# Patient Record
Sex: FEMALE
[De-identification: names, ages, dates, MRNs, and addresses within clinical notes are randomized per-mention and may not be internally consistent; named-entity substitution may affect disease eponyms.]

---

## 2024-06-25 ENCOUNTER — DASHBOARD ENCOUNTERS (OUTPATIENT)
Age: 39
End: 2024-06-25

## 2024-07-02 ENCOUNTER — OFFICE VISIT (OUTPATIENT)
Dept: URBAN - METROPOLITAN AREA CLINIC 60 | Facility: CLINIC | Age: 39
End: 2024-07-02
Payer: COMMERCIAL

## 2024-07-02 VITALS
DIASTOLIC BLOOD PRESSURE: 76 MMHG | OXYGEN SATURATION: 98 % | RESPIRATION RATE: 20 BRPM | BODY MASS INDEX: 31.5 KG/M2 | HEIGHT: 62 IN | WEIGHT: 171.2 LBS | TEMPERATURE: 98.1 F | SYSTOLIC BLOOD PRESSURE: 116 MMHG | HEART RATE: 80 BPM

## 2024-07-02 DIAGNOSIS — K29.60 REFLUX GASTRITIS: ICD-10-CM

## 2024-07-02 PROCEDURE — 99214 OFFICE O/P EST MOD 30 MIN: CPT | Performed by: NURSE PRACTITIONER

## 2024-07-02 RX ORDER — OMEPRAZOLE 20 MG/1
1 CAPSULE 30 MINUTES BEFORE MORNING MEAL CAPSULE, DELAYED RELEASE ORAL ONCE A DAY
Qty: 30 | Refills: 2 | OUTPATIENT
Start: 2024-07-02

## 2024-07-02 RX ORDER — SUCRALFATE 1 G/1
1 TABLET ON AN EMPTY STOMACH TABLET ORAL TWICE A DAY
Qty: 180 TABLET | Refills: 0 | OUTPATIENT
Start: 2024-07-02 | End: 2024-09-30

## 2024-07-02 NOTE — HPI-TODAY'S VISIT:
7/24 Patient here today complaining having symptom of gastritis and reflux.  Her symptoms are chronic but now seems to be worse.  Patient will do diet and treatment with omeprazole 20 mg once a day and Carafate 1 g twice a day. EGD.

## 2024-07-09 ENCOUNTER — LAB OUTSIDE AN ENCOUNTER (OUTPATIENT)
Dept: URBAN - METROPOLITAN AREA CLINIC 60 | Facility: CLINIC | Age: 39
End: 2024-07-09

## 2024-08-03 PROBLEM — 57433008: Status: ACTIVE | Noted: 2024-08-03

## 2024-08-30 ENCOUNTER — CLAIMS CREATED FROM THE CLAIM WINDOW (OUTPATIENT)
Dept: URBAN - METROPOLITAN AREA SURGERY CENTER 4 | Facility: SURGERY CENTER | Age: 39
End: 2024-08-30
Payer: COMMERCIAL

## 2024-08-30 DIAGNOSIS — K29.60 OTHER GASTRITIS WITHOUT BLEEDING: ICD-10-CM

## 2024-08-30 DIAGNOSIS — K22.89 OTHER SPECIFIED DISEASE OF ESOPHAGUS: ICD-10-CM

## 2024-08-30 DIAGNOSIS — K44.9 DIAPHRAGMATIC HERNIA WITHOUT OBSTRUCTION OR GANGRENE: ICD-10-CM

## 2024-08-30 PROCEDURE — 43239 EGD BIOPSY SINGLE/MULTIPLE: CPT | Performed by: INTERNAL MEDICINE

## 2024-08-30 RX ORDER — OMEPRAZOLE 20 MG/1
1 CAPSULE 30 MINUTES BEFORE MORNING MEAL CAPSULE, DELAYED RELEASE ORAL ONCE A DAY
Qty: 30 | Refills: 2 | Status: ACTIVE | COMMUNITY
Start: 2024-07-02

## 2024-08-30 RX ORDER — SUCRALFATE 1 G/1
1 TABLET ON AN EMPTY STOMACH TABLET ORAL TWICE A DAY
Qty: 180 TABLET | Refills: 0 | Status: ACTIVE | COMMUNITY
Start: 2024-07-02 | End: 2024-09-30

## 2024-09-12 ENCOUNTER — OFFICE VISIT (OUTPATIENT)
Dept: URBAN - METROPOLITAN AREA CLINIC 60 | Facility: CLINIC | Age: 39
End: 2024-09-12

## 2024-09-27 ENCOUNTER — OFFICE VISIT (OUTPATIENT)
Dept: URBAN - METROPOLITAN AREA CLINIC 60 | Facility: CLINIC | Age: 39
End: 2024-09-27

## 2024-10-02 ENCOUNTER — ERX REFILL RESPONSE (OUTPATIENT)
Dept: URBAN - METROPOLITAN AREA CLINIC 60 | Facility: CLINIC | Age: 39
End: 2024-10-02

## 2024-10-02 RX ORDER — OMEPRAZOLE 20 MG/1
TAKE 1 CAPSULE BY MOUTH DAILY 30 MINUTES BEFORE BREAKFAST CAPSULE, DELAYED RELEASE ORAL
Qty: 30 CAPSULE | Refills: 0 | OUTPATIENT

## 2024-10-02 RX ORDER — OMEPRAZOLE 20 MG/1
TAKE 1 CAPSULE BY MOUTH DAILY 30 MINUTES BEFORE BREAKFAST CAPSULE, DELAYED RELEASE ORAL
Qty: 90 CAPSULE | Refills: 0 | OUTPATIENT

## 2024-10-02 RX ORDER — OMEPRAZOLE 20 MG/1
1 CAPSULE 30 MINUTES BEFORE MORNING MEAL CAPSULE, DELAYED RELEASE ORAL ONCE A DAY
Qty: 30 | Refills: 2 | OUTPATIENT

## 2024-11-06 ENCOUNTER — OFFICE VISIT (OUTPATIENT)
Dept: URBAN - METROPOLITAN AREA CLINIC 60 | Facility: CLINIC | Age: 39
End: 2024-11-06

## 2024-11-07 ENCOUNTER — OFFICE VISIT (OUTPATIENT)
Dept: URBAN - METROPOLITAN AREA CLINIC 60 | Facility: CLINIC | Age: 39
End: 2024-11-07

## 2024-11-07 RX ORDER — OMEPRAZOLE 20 MG/1
TAKE 1 CAPSULE BY MOUTH DAILY 30 MINUTES BEFORE BREAKFAST CAPSULE, DELAYED RELEASE ORAL
Qty: 90 CAPSULE | Refills: 0 | Status: ACTIVE | COMMUNITY

## 2025-01-17 ENCOUNTER — LAB OUTSIDE AN ENCOUNTER (OUTPATIENT)
Dept: URBAN - METROPOLITAN AREA CLINIC 63 | Facility: CLINIC | Age: 40
End: 2025-01-17

## 2025-01-17 ENCOUNTER — OFFICE VISIT (OUTPATIENT)
Dept: URBAN - METROPOLITAN AREA CLINIC 60 | Facility: CLINIC | Age: 40
End: 2025-01-17

## 2025-01-17 ENCOUNTER — OFFICE VISIT (OUTPATIENT)
Dept: URBAN - METROPOLITAN AREA CLINIC 63 | Facility: CLINIC | Age: 40
End: 2025-01-17
Payer: COMMERCIAL

## 2025-01-17 VITALS
DIASTOLIC BLOOD PRESSURE: 64 MMHG | BODY MASS INDEX: 32.57 KG/M2 | OXYGEN SATURATION: 99 % | WEIGHT: 177 LBS | TEMPERATURE: 97.4 F | HEIGHT: 62 IN | HEART RATE: 78 BPM | SYSTOLIC BLOOD PRESSURE: 110 MMHG

## 2025-01-17 DIAGNOSIS — R10.13 EPIGASTRIC PAIN: ICD-10-CM

## 2025-01-17 DIAGNOSIS — R19.7 ACUTE DIARRHEA: ICD-10-CM

## 2025-01-17 PROCEDURE — 99214 OFFICE O/P EST MOD 30 MIN: CPT

## 2025-01-17 RX ORDER — OMEPRAZOLE 20 MG/1
TAKE 1 CAPSULE BY MOUTH DAILY 30 MINUTES BEFORE BREAKFAST CAPSULE, DELAYED RELEASE ORAL
Qty: 90 CAPSULE | Refills: 0 | Status: ACTIVE | COMMUNITY

## 2025-01-17 NOTE — HPI-TODAY'S VISIT:
1/25 This is a 39 years old female patient with past medical history of gastritis and.  Seen in the office visit today complaining about epigastric pain (Left upper quadrant) associated with food ingestion, patient reports the pain usually comes after she eats, patient also complaining about diarrhea episodes.  No dysphagia, heartburn, regurgitation, unintentional weight loss, nausea, vomiting, hematemesis or melena. no blood per rectum. No complaints of bloating. No jaundice, icterus. There have been no new complaints of cough, fever, joint pain or muscle aches.     8/30/2024 EGD Findings: The examined esophagus was normal.  Biopsies were taken with cold forceps for histology. Small hiatal hernia was present. Diffuse mild inflammation characterized by edema and congestion edema was found in the gastric body and the gastric antrum.  Biopsies were taken with cold forceps for H. pylori testing. The examined duodenum was normal.  Biopsies were taken with cold forceps for histology. 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

## 2025-01-27 LAB
A/G RATIO: 1.5
ALBUMIN: 4.4
ALKALINE PHOSPHATASE: 65
ALT (SGPT): 33
AMYLASE: 51
AST (SGOT): 24
BILIRUBIN, TOTAL: 0.4
BUN/CREATININE RATIO: (no result)
BUN: 13
CALCIUM: 9.3
CARBON DIOXIDE, TOTAL: 28
CHLORIDE: 102
CHOL/HDLC RATIO: 4.3
CHOLESTEROL, TOTAL: 190
CREATININE: 0.74
EGFR: 105
GLOBULIN, TOTAL: 3
GLUCOSE: 83
HDL CHOLESTEROL: 44
HEMATOCRIT: 39.5
HEMOGLOBIN: 12.8
IGG, SUBCLASS 1: 427
IGG, SUBCLASS 2: 605
IGG, SUBCLASS 3: 64
IGG, SUBCLASS 4: 41.2
IMMUNOGLOBULIN G, QN, SERUM: 1153
INR: 1
LDL CHOLESTEROL CALC: 112
LIPASE: 28
MCH: 28
MCHC: 32.4
MCV: 86.4
MPV: 10
NON HDL CHOLESTEROL: 146
PLATELET COUNT: 264
POTASSIUM: 4.3
PROTEIN, TOTAL: 7.4
PT: 10.3
RDW: 12.5
RED BLOOD CELL COUNT: 4.57
SODIUM: 138
TRIGLYCERIDES: 227
WHITE BLOOD CELL COUNT: 8.2

## 2025-02-26 ENCOUNTER — OFFICE VISIT (OUTPATIENT)
Dept: URBAN - METROPOLITAN AREA CLINIC 63 | Facility: CLINIC | Age: 40
End: 2025-02-26

## 2025-03-03 ENCOUNTER — LAB OUTSIDE AN ENCOUNTER (OUTPATIENT)
Dept: URBAN - METROPOLITAN AREA CLINIC 60 | Facility: CLINIC | Age: 40
End: 2025-03-03

## 2025-03-03 ENCOUNTER — OFFICE VISIT (OUTPATIENT)
Dept: URBAN - METROPOLITAN AREA CLINIC 60 | Facility: CLINIC | Age: 40
End: 2025-03-03
Payer: COMMERCIAL

## 2025-03-03 VITALS
WEIGHT: 172.2 LBS | DIASTOLIC BLOOD PRESSURE: 60 MMHG | SYSTOLIC BLOOD PRESSURE: 110 MMHG | BODY MASS INDEX: 31.69 KG/M2 | HEART RATE: 75 BPM | HEIGHT: 62 IN | OXYGEN SATURATION: 98 % | TEMPERATURE: 97.7 F

## 2025-03-03 DIAGNOSIS — Z86.19 HISTORY OF HELICOBACTER PYLORI INFECTION: ICD-10-CM

## 2025-03-03 DIAGNOSIS — K76.0 FATTY LIVER: ICD-10-CM

## 2025-03-03 DIAGNOSIS — K29.60 REFLUX GASTRITIS: ICD-10-CM

## 2025-03-03 PROBLEM — 197321007: Status: ACTIVE | Noted: 2025-03-03

## 2025-03-03 PROCEDURE — 99214 OFFICE O/P EST MOD 30 MIN: CPT

## 2025-03-03 RX ORDER — OMEPRAZOLE 20 MG/1
TAKE 1 CAPSULE BY MOUTH DAILY 30 MINUTES BEFORE BREAKFAST CAPSULE, DELAYED RELEASE ORAL
Qty: 90 CAPSULE | Refills: 0 | Status: ACTIVE | COMMUNITY

## 2025-03-03 NOTE — HPI-TODAY'S VISIT:
1/25 This is a 39 years old female patient with past medical history of gastritis and.  Seen in the office visit today complaining about epigastric pain (Left upper quadrant) associated with food ingestion, patient reports the pain usually comes after she eats, patient also complaining about diarrhea episodes.  No dysphagia, heartburn, regurgitation, unintentional weight loss, nausea, vomiting, hematemesis or melena. No blood per rectum. No complaints of bloating. No jaundice, icterus. There have been no new complaints of cough, fever, joint pain or muscle aches.   8/30/2024 EGD Findings: The examined esophagus was normal.  Biopsies were taken with cold forceps for histology. Small hiatal hernia was present. Diffuse mild inflammation characterized by edema and congestion edema was found in the gastric body and the gastric antrum.  Biopsies were taken with cold forceps for H. pylori testing. The examined duodenum was normal.  Biopsies were taken with cold forceps for histology. 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  3/35 Patient seen today in office visit in good general state she is here for follow-up medical treatment and CT. Patient reporting she does not have more epigastric pain with diarrhea, patient is eating very healthy, she is tried to avoid the fatty food, patient is following the GERD diet, drinking plenty of water reports, reported acid reflux and heartburn symptoms are staying the same, patient reports that she remembers when she was diagnosed with H. pylori she development the same symptoms, will do H. pylori test, patient CT discussed fatty liver was found, will do FibroScan.  Will follow up in 6 weeks.

## 2025-03-27 ENCOUNTER — TELEPHONE ENCOUNTER (OUTPATIENT)
Dept: URBAN - METROPOLITAN AREA CLINIC 59 | Facility: CLINIC | Age: 40
End: 2025-03-27

## 2025-03-27 LAB — H PYLORI BREATH TEST: DETECTED

## 2025-03-31 ENCOUNTER — OFFICE VISIT (OUTPATIENT)
Dept: URBAN - METROPOLITAN AREA CLINIC 60 | Facility: CLINIC | Age: 40
End: 2025-03-31
Payer: COMMERCIAL

## 2025-03-31 DIAGNOSIS — A04.8 H. PYLORI INFECTION: ICD-10-CM

## 2025-03-31 DIAGNOSIS — K29.60 REFLUX GASTRITIS: ICD-10-CM

## 2025-03-31 PROCEDURE — 99214 OFFICE O/P EST MOD 30 MIN: CPT

## 2025-03-31 RX ORDER — OMEPRAZOLE MAGNESIUM, AMOXICILLIN AND RIFABUTIN 10; 250; 12.5 MG/1; MG/1; MG/1
4 CAPSULES CAPSULE, DELAYED RELEASE ORAL
Qty: 168 | OUTPATIENT
Start: 2025-03-31 | End: 2025-04-14

## 2025-03-31 RX ORDER — OMEPRAZOLE 20 MG/1
TAKE 1 CAPSULE BY MOUTH DAILY 30 MINUTES BEFORE BREAKFAST CAPSULE, DELAYED RELEASE ORAL
Qty: 90 CAPSULE | Refills: 0 | Status: ACTIVE | COMMUNITY

## 2025-03-31 NOTE — HPI-TODAY'S VISIT:
1/25 This is a 39 years old female patient with past medical history of gastritis and.  Seen in the office visit today complaining about epigastric pain (Left upper quadrant) associated with food ingestion, patient reports the pain usually comes after she eats, patient also complaining about diarrhea episodes.  No dysphagia, heartburn, regurgitation, unintentional weight loss, nausea, vomiting, hematemesis or melena. No blood per rectum. No complaints of bloating. No jaundice, icterus. There have been no new complaints of cough, fever, joint pain or muscle aches.   8/30/2024 EGD Findings: The examined esophagus was normal.  Biopsies were taken with cold forceps for histology. Small hiatal hernia was present. Diffuse mild inflammation characterized by edema and congestion edema was found in the gastric body and the gastric antrum.  Biopsies were taken with cold forceps for H. pylori testing. The examined duodenum was normal.  Biopsies were taken with cold forceps for histology. 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  3/35 Patient seen today in office visit in good general state she is here for follow-up medical treatment and CT. Patient reporting she does not have more epigastric pain with diarrhea, patient is eating very healthy, she is tried to avoid the fatty food, patient is following the GERD diet, drinking plenty of water reports, reported acid reflux and heartburn symptoms are staying the same, patient reports that she remembers when she was diagnosed with H. pylori she development the same symptoms, will do H. pylori test, patient CT discussed fatty liver was found, will do FibroScan.  Will follow up in 6 weeks.  3/25 Patient is here in the office visit to follow-up urea breath test, patient reported PCP  ordered treatment for H. pylori she completed treatment after 14 days she repeated urea breath test came back positive result, will start treatment with talicia, patient reported she continues with the same symptoms acid reflux and heartburn does improve, will follow up patient in 6 weeks.

## 2025-04-07 ENCOUNTER — TELEPHONE ENCOUNTER (OUTPATIENT)
Dept: URBAN - METROPOLITAN AREA CLINIC 63 | Facility: CLINIC | Age: 40
End: 2025-04-07

## 2025-04-07 RX ORDER — OMEPRAZOLE 20 MG/1
1 CAPSULE 30 MINUTES BEFORE MORNING MEAL CAPSULE, DELAYED RELEASE ORAL TWICE A DAY
Qty: 28 CAPSULE | Refills: 0 | OUTPATIENT
Start: 2025-04-11

## 2025-04-07 RX ORDER — BISMUTH SUBSALICYLATE 262 MG
2 TABLETS WITH MEALS AND AT BEDTIME TABLET,CHEWABLE ORAL
Qty: 112 TABLET | Refills: 0 | OUTPATIENT
Start: 2025-04-11 | End: 2025-04-25

## 2025-04-07 RX ORDER — TETRACYCLINE HYDROCHLORIDE 500 MG/1
1 CAPSULE ON AN EMPTY STOMACH CAPSULE ORAL
Qty: 56 CAPSULE | Refills: 0 | OUTPATIENT
Start: 2025-04-11 | End: 2025-04-25

## 2025-04-07 RX ORDER — METRONIDAZOLE 500 MG/1
1 TABLET TABLET ORAL THREE TIMES A DAY
Qty: 42 TABLET | Refills: 0 | OUTPATIENT
Start: 2025-04-11 | End: 2025-04-25

## 2025-04-28 ENCOUNTER — OFFICE VISIT (OUTPATIENT)
Dept: URBAN - METROPOLITAN AREA CLINIC 60 | Facility: CLINIC | Age: 40
End: 2025-04-28

## 2025-05-13 ENCOUNTER — OFFICE VISIT (OUTPATIENT)
Dept: URBAN - METROPOLITAN AREA CLINIC 63 | Facility: CLINIC | Age: 40
End: 2025-05-13

## 2025-05-13 RX ORDER — OMEPRAZOLE 20 MG/1
TAKE 1 CAPSULE BY MOUTH DAILY 30 MINUTES BEFORE BREAKFAST CAPSULE, DELAYED RELEASE ORAL
Qty: 90 CAPSULE | Refills: 0 | COMMUNITY

## 2025-05-28 ENCOUNTER — LAB OUTSIDE AN ENCOUNTER (OUTPATIENT)
Dept: URBAN - METROPOLITAN AREA CLINIC 63 | Facility: CLINIC | Age: 40
End: 2025-05-28

## 2025-05-28 ENCOUNTER — OFFICE VISIT (OUTPATIENT)
Dept: URBAN - METROPOLITAN AREA CLINIC 63 | Facility: CLINIC | Age: 40
End: 2025-05-28
Payer: COMMERCIAL

## 2025-05-28 DIAGNOSIS — K76.0 FATTY LIVER: ICD-10-CM

## 2025-05-28 DIAGNOSIS — K29.60 REFLUX GASTRITIS: ICD-10-CM

## 2025-05-28 PROCEDURE — 99214 OFFICE O/P EST MOD 30 MIN: CPT

## 2025-05-28 RX ORDER — OMEPRAZOLE 20 MG/1
1 CAPSULE 30 MINUTES BEFORE MORNING MEAL CAPSULE, DELAYED RELEASE ORAL TWICE A DAY
Qty: 28 CAPSULE | Refills: 0 | Status: ACTIVE | COMMUNITY
Start: 2025-04-11

## 2025-05-28 RX ORDER — OMEPRAZOLE 20 MG/1
TAKE 1 CAPSULE BY MOUTH DAILY 30 MINUTES BEFORE BREAKFAST CAPSULE, DELAYED RELEASE ORAL
Qty: 90 CAPSULE | Refills: 0 | COMMUNITY

## 2025-05-28 NOTE — PHYSICAL EXAM CONSTITUTIONAL:
well developed, well nourished , in no acute distress , ambulating without difficulty , normal communication ability
Detail Level: Detailed
Quality 47: Advance Care Plan: Advance Care Planning discussed and documented; advance care plan or surrogate decision maker documented in the medical record.
Quality 226: Preventive Care And Screening: Tobacco Use: Screening And Cessation Intervention: Patient screened for tobacco use and is an ex/non-smoker

## 2025-05-28 NOTE — HPI-TODAY'S VISIT:
1/25 This is a 39 years old female patient with past medical history of gastritis and.  Seen in the office visit today complaining about epigastric pain (Left upper quadrant) associated with food ingestion, patient reports the pain usually comes after she eats, patient also complaining about diarrhea episodes.  No dysphagia, heartburn, regurgitation, unintentional weight loss, nausea, vomiting, hematemesis or melena. No blood per rectum. No complaints of bloating. No jaundice, icterus. There have been no new complaints of cough, fever, joint pain or muscle aches.   8/30/2024 EGD Findings: The examined esophagus was normal.  Biopsies were taken with cold forceps for histology. Small hiatal hernia was present. Diffuse mild inflammation characterized by edema and congestion edema was found in the gastric body and the gastric antrum.  Biopsies were taken with cold forceps for H. pylori testing. The examined duodenum was normal.  Biopsies were taken with cold forceps for histology. 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  3/35 Patient seen today in office visit in good general state she is here for follow-up medical treatment and CT. Patient reporting she does not have more epigastric pain with diarrhea, patient is eating very healthy, she is tried to avoid the fatty food, patient is following the GERD diet, drinking plenty of water reports, reported acid reflux and heartburn symptoms are staying the same, patient reports that she remembers when she was diagnosed with H. pylori she development the same symptoms, will do H. pylori test, patient CT discussed fatty liver was found, will do FibroScan.  Will follow up in 6 weeks.  3/25 Patient is here in the office visit to follow-up urea breath test, patient reported PCP  ordered treatment for H. pylori she completed treatment after 14 days she repeated urea breath test came back positive result, will start treatment with talicia, patient reported she continues with the same symptoms acid reflux and heartburn does improve, will follow up patient in 6 weeks.  5/25 Patient here today to follow-up H. pylori breath test  and FibroScan result, H. pylori negative result, FibroScan  steatosis grade S0, fibrosis stage  F2, patient reported she has never had issues with the liver, and she would like to repeat the test in order facility, patient denies acid reflux or heartburn, reported after she finished with treatment all symptoms disappeared, will plan to repeat FibroScan as patient request, continue with current treatment, if new FibroScan demonstrated same result with plan to do MRI elastography.  Follow-up patient in 3 months.

## 2025-08-21 ENCOUNTER — TELEPHONE ENCOUNTER (OUTPATIENT)
Dept: URBAN - METROPOLITAN AREA CLINIC 63 | Facility: CLINIC | Age: 40
End: 2025-08-21

## 2025-08-28 ENCOUNTER — OFFICE VISIT (OUTPATIENT)
Dept: URBAN - METROPOLITAN AREA CLINIC 63 | Facility: CLINIC | Age: 40
End: 2025-08-28
Payer: COMMERCIAL

## 2025-08-28 DIAGNOSIS — K76.0 FATTY LIVER: ICD-10-CM

## 2025-08-28 DIAGNOSIS — K29.60 REFLUX GASTRITIS: ICD-10-CM

## 2025-08-28 PROCEDURE — 99214 OFFICE O/P EST MOD 30 MIN: CPT

## 2025-08-28 RX ORDER — OMEPRAZOLE 20 MG/1
1 CAPSULE 30 MINUTES BEFORE MORNING MEAL CAPSULE, DELAYED RELEASE ORAL TWICE A DAY
Qty: 28 CAPSULE | Refills: 0 | Status: ACTIVE | COMMUNITY
Start: 2025-04-11